# Patient Record
Sex: MALE | Race: WHITE | NOT HISPANIC OR LATINO | URBAN - METROPOLITAN AREA
[De-identification: names, ages, dates, MRNs, and addresses within clinical notes are randomized per-mention and may not be internally consistent; named-entity substitution may affect disease eponyms.]

---

## 2024-02-12 ENCOUNTER — APPOINTMENT (OUTPATIENT)
Dept: OTOLARYNGOLOGY | Facility: CLINIC | Age: 29
End: 2024-02-12
Payer: COMMERCIAL

## 2024-02-14 ENCOUNTER — OFFICE VISIT (OUTPATIENT)
Dept: OTOLARYNGOLOGY | Facility: CLINIC | Age: 29
End: 2024-02-14
Payer: COMMERCIAL

## 2024-02-14 ENCOUNTER — CLINICAL SUPPORT (OUTPATIENT)
Dept: AUDIOLOGY | Facility: CLINIC | Age: 29
End: 2024-02-14
Payer: COMMERCIAL

## 2024-02-14 VITALS
WEIGHT: 191 LBS | SYSTOLIC BLOOD PRESSURE: 138 MMHG | HEIGHT: 68 IN | BODY MASS INDEX: 28.95 KG/M2 | DIASTOLIC BLOOD PRESSURE: 76 MMHG

## 2024-02-14 DIAGNOSIS — H61.23 BILATERAL IMPACTED CERUMEN: ICD-10-CM

## 2024-02-14 DIAGNOSIS — H93.8X2 FULLNESS IN EAR, LEFT: ICD-10-CM

## 2024-02-14 DIAGNOSIS — H90.12 CONDUCTIVE HEARING LOSS OF LEFT EAR WITH UNRESTRICTED HEARING OF RIGHT EAR: Primary | ICD-10-CM

## 2024-02-14 DIAGNOSIS — H62.42 OTITIS EXTERNA, FUNGAL, LEFT EAR: Primary | ICD-10-CM

## 2024-02-14 DIAGNOSIS — B36.9 OTITIS EXTERNA, FUNGAL, LEFT EAR: Primary | ICD-10-CM

## 2024-02-14 DIAGNOSIS — H90.12 CONDUCTIVE HEARING LOSS OF LEFT EAR WITH UNRESTRICTED HEARING OF RIGHT EAR: ICD-10-CM

## 2024-02-14 PROCEDURE — 99203 OFFICE O/P NEW LOW 30 MIN: CPT

## 2024-02-14 PROCEDURE — 69210 REMOVE IMPACTED EAR WAX UNI: CPT

## 2024-02-14 PROCEDURE — 92557 COMPREHENSIVE HEARING TEST: CPT | Performed by: AUDIOLOGIST

## 2024-02-14 PROCEDURE — 92550 TYMPANOMETRY & REFLEX THRESH: CPT | Performed by: AUDIOLOGIST

## 2024-02-14 RX ORDER — CLOTRIMAZOLE 1 G/ML
SOLUTION TOPICAL
Qty: 30 ML | Refills: 0 | Status: SHIPPED | OUTPATIENT
Start: 2024-02-14 | End: 2024-03-06 | Stop reason: WASHOUT

## 2024-02-14 ASSESSMENT — PATIENT HEALTH QUESTIONNAIRE - PHQ9
2. FEELING DOWN, DEPRESSED OR HOPELESS: NOT AT ALL
SUM OF ALL RESPONSES TO PHQ9 QUESTIONS 1 AND 2: 0
1. LITTLE INTEREST OR PLEASURE IN DOING THINGS: NOT AT ALL

## 2024-02-14 ASSESSMENT — PAIN SCALES - GENERAL: PAINLEVEL_OUTOF10: 0 - NO PAIN

## 2024-02-14 ASSESSMENT — PAIN - FUNCTIONAL ASSESSMENT: PAIN_FUNCTIONAL_ASSESSMENT: 0-10

## 2024-02-14 ASSESSMENT — ENCOUNTER SYMPTOMS: OCCASIONAL FEELINGS OF UNSTEADINESS: 0

## 2024-02-14 NOTE — PROGRESS NOTES
AUDIOLOGY ADULT AUDIOMETRIC EVALUATION      Name:  Tank Bernardo  :  1995  Age:  28 y.o.  Date of Evaluation: 24    History:  Reason for visit:  Mr. Tank Bernardo was seen today as part of the visit with FADI Kang for an evaluation of hearing.   Chief Complaint   Patient presents with    Hearing Loss    Ear Fullness      Stated this past September he experienced a left sided ear infection, and completed two rounds of antibiotics. Mentioned the medications appeared to have relieved his pain, however, he continues to note problems in the left ear. This past December, he noticed a continued sensation of left sided ear fullness, or feeling like a sensation of something (fluid) may be in the left ear.   Although the hearing has improved, he noted the hearing on the left ear is decreased and he is not hearing as well as on the right.   Denied any concerns for the right ear or previous concerns for hearing loss.   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   Denied any current otalgia, tinnitus, dizziness/vertigo, ear surgery, recent falls, ear drainage, or sudden hearing loss.    EVALUATION     See Audiogram    RESULTS:    Otoscopic Evaluation:   Right Ear: Otoscopy revealed minimal cerumen with a healthy canal and a healthy tympanic membrane was visualized.    Left Ear: Otoscopy revealed deep soft partially occluding white cerumen/debris/infection and the tympanic membrane was unable able to be clearly visualized.     Immittance:  Immittance Measures: 226 Hz   Right Ear: Tympanometric testing revealed a normal type A tympanogram with normal middle ear pressure and normal static compliance.  Left Ear: Tympanometric testing revealed a type B flat tympanogram with no measurable middle ear pressure or static compliance and normal ear canal volume. Results may be consistent with excessive cerumen/debris or middle ear effusion.     Right Ear: Ipsilateral  acoustic reflexes were present at, 500-4,000 Hz, at expected sensation levels.  Left Ear: Ipsilateral acoustic reflexes were absent at, 500-4,000 Hz. Results are consistent with the test results.     Test technique:  Pure Tone Audiometry via TDH headphones    Reliability:   excellent    Pure Tone Audiometry:    Right Ear: Audiometric testing indicated normal peripheral hearing sensitivity from 125-8,000 Hz.   Left Ear:   Audiometric testing indicated normal peripheral hearing sensitivity through 1,000 Hz, sloping to a mild to slight essentially conductive hearing loss above. Note conductive components below 1,000 Hz.       Speech Audiometry:   Right Ear:  Speech Reception Threshold (SRT) was obtained at 10 dBHL                  Word Recognition scores were excellent (100%) in quiet when words were presented at 50 dBHL  Left Ear:  Speech Reception Threshold (SRT) was obtained at 20 dBHL                 Word Recognition scores were excellent (100%) in quiet when words were presented at 50 dBHL  Testing was performed with recorded NU-6 speech words in quiet. Speech thresholds were in good agreement with the pure tone averages in each ear.     IMPRESSIONS:  Today's test results are hearing loss requiring medical/otologic and audiologic follow-up.  The patient was counseled with regard to the findings.    RECOMMENDATIONS:  * Continue medical follow up with FADI Kang.  * Retest as medically indicated, or sooner if a change in hearing sensitivity is noticed.   * Wear hearing protection while in the presence of loud sounds.   * Use effective communication strategies such as asking the speaker to gain attention prior to speaking, speaking in the same room, repeating words that were heard, etc.    PATIENT EDUCATION:   Discussed results and recommendations with the patient and a copy of the hearing test was provided.  Questions were addressed and the patient was encouraged to contact our department should  concerns arise.  The patient was seen from  1:30-2:00 pm.

## 2024-02-15 NOTE — PATIENT INSTRUCTIONS
Dry Ear Precautions: Avoid swimming or hot tubs until ear infection is resolved. If you must swim, please use silicone ear plugs or rubber swim caps and make sure no water gets in the ears.  Use cotton ball covered with Vaseline before showering. Apply Vaseline on cotton ball and place it just at the opening of ear canal to create a water seal. Do not push the cotton ball all the way in the canal. After shower, remove the cotton ball and wipe off excess Vaseline using dry clean cloth. Then, use hair dryer on warm or cool air and hold it out 12 inches away from the affected ear and air dry ears for 30 seconds.

## 2024-03-06 ENCOUNTER — OFFICE VISIT (OUTPATIENT)
Dept: OTOLARYNGOLOGY | Facility: CLINIC | Age: 29
End: 2024-03-06
Payer: COMMERCIAL

## 2024-03-06 VITALS
HEIGHT: 68 IN | WEIGHT: 195 LBS | SYSTOLIC BLOOD PRESSURE: 135 MMHG | TEMPERATURE: 98.2 F | BODY MASS INDEX: 29.55 KG/M2 | DIASTOLIC BLOOD PRESSURE: 83 MMHG

## 2024-03-06 DIAGNOSIS — B36.9 OTITIS EXTERNA, FUNGAL, LEFT EAR: Primary | ICD-10-CM

## 2024-03-06 DIAGNOSIS — H61.22 IMPACTED CERUMEN OF LEFT EAR: ICD-10-CM

## 2024-03-06 DIAGNOSIS — H62.42 OTITIS EXTERNA, FUNGAL, LEFT EAR: Primary | ICD-10-CM

## 2024-03-06 PROCEDURE — 99213 OFFICE O/P EST LOW 20 MIN: CPT

## 2024-03-06 PROCEDURE — 69210 REMOVE IMPACTED EAR WAX UNI: CPT

## 2024-03-06 RX ORDER — CLOTRIMAZOLE AND BETAMETHASONE DIPROPIONATE 10; .64 MG/G; MG/G
1 CREAM TOPICAL ONCE
Status: SHIPPED | OUTPATIENT
Start: 2024-03-06

## 2024-03-06 ASSESSMENT — PATIENT HEALTH QUESTIONNAIRE - PHQ9
2. FEELING DOWN, DEPRESSED OR HOPELESS: NOT AT ALL
1. LITTLE INTEREST OR PLEASURE IN DOING THINGS: NOT AT ALL
SUM OF ALL RESPONSES TO PHQ9 QUESTIONS 1 AND 2: 0

## 2024-03-06 NOTE — PROGRESS NOTES
Patient ID: Tank Bernardo is a 28 y.o. male who presents for subsequent evaluation of left hearing loss, left aural fullness, left otorrhea and left ear itching.     PROVIDER IMPRESSIONS:  DIAGNOSES/PROBLEMS:  -Left fungal otitis externa  -Cerumen impaction left ear    ASSESSMENT:   Tank Bernardo is a pleasant 28 y.o. male who presents for subsequent evaluation of left hearing loss, left aural fullness, left otorrhea and mild left ear itching. Since last visit, symptoms are improved with use of clotrimazole otic drops for left fungal OE and boric acid powder applied to the left EAC at last ear cleaning.  Exam findings today revealed left EAC appears moist/erythematous with recurrent impaction of white rubbery fungal debris/cerumen Using appropriate instrumentation, impacted cerumen/fungal debris successfully removed and 2cc of lotrisone cream instilled into the left EAC. Based on the clinical information provided, symptoms and clinical exam findings are consistent with ongoing left fungal otitis externa.     PLAN:  Patient instructed to keep a cotton ball in the left ear and change cotton ball twice daily/as needed for management.   Follow-up: Patient may schedule for follow-up in 1-2 weeks for repeat aural toilet and to evaluate treatment outcomes. Patient is agreeable to this plan, all questions were answered to patient's satisfaction.     Subjective   HPI: Tank Bernardo is a 28 y.o. male who presents for 3 week follow-up for left hearing loss, left aural fullness, left otorrhea and mild left ear itching. Since last visit on 2/14/24, there has been moderate improvement in symptoms. Reports that his hearing has significantly improved in the left ear. In review, previous recommendations included clotrimazole otic drops for left fungal OE. Patient states they have been consistent with the recommended treatment. Denies the presence of new symptoms including hearing loss, ear pain, ear itching, tinnitus, ear drainage, ear  fullness/pressure, autophony, dizziness and/or vertigo.    RECALL 2/14/24:  HPI: Tank Bernardo is a 28 y.o. male who presents for the evaluation of left hearing loss and left aural fullness. The patient states that symptom onset began approximately 5 months ago and gradually progressed over time. Patient seen at urgent care in September 2023 for left ear pain, left ear fullness and left muffled hearing sensation and was prescribed p.o. cefdinir for an ear infection that provided mild symptom benefit of ear pain after completion. A few weeks later he received second course of p.o. cefdinir for ongoing symptoms which resolved left ear pain entirely after completion but did not improve left-sided hearing difficulty and ear fullness sensation. Describes left hearing loss as a muffled sound that he compares to hearing underwater and that changes with external ear movement. States that hearing in the left ear is currently around 50% of baseline hearing function. When asked about the presence of right hearing loss, ear pain, right ear fullness/pressure, tinnitus, ear itching, ear drainage, autophony, dizziness or vertigo, he admits to slight left ear itching and left ear drainage that appears white/yellow/crusted on his pillow when he lays on his left side. When asked about pertinent otologic history, the patient denies a history of recurrent ear infections, denies history of ear surgery, denies history of PE tube insertion. 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 The patient does not insert Q-tips in the ear canals but reports insertion of an o.t.c. ear endoscopic camera with a plastic applicator tool into the ear canals that he received in December 2023. He also states that he will occasionally instill hydrogen peroxide  into the ear canals for cleaning. The patient denies history of wearing hearing aid devices. The patient does not endorse a family history of hearing loss. Mentions a history of allergies to amoxicillin, stating that he has had a reaction in childhood of rash, nausea, and vomiting.    ASSESSMENT: Tank Bernardo is a pleasant 28 y.o. male who presents with symptoms of left hearing loss, left aural fullness, left otorrhea and mild left ear itching for the past 5 months. Based on the clinical information provided, symptoms and clinical exam findings are consistent with bilateral cerumen impaction and left fungal otitis externa (OE). Using appropriate instrumentation, impacted cerumen was removed from bilateral EACs. Impacted white/rubbery/moist fungal debris was also removed from left EAC, which appeared moist/inflamed/erythematous after cleaning, with application of 1 puff of boric acid powder to the left ear in clinic today after cleaning for antifungal treatment. Reassurance provided to patient that otologic exam today revealed no evidence of acute infection/inflammation in the right external auditory canal (EAC) and that tympanic membrane (TM) appears with no evidence of infection, effusion, retraction or perforation bilaterally.  Audiogram reviewed in detail with the patient, which revealed left conductive hearing loss and type B tympanogram on the left which is likely secondary to excessive cerumen and fungal debris in the left EAC during audiogram.   PLAN:  I recommended Clotrimazole 1% solution with 4-5 drops into the left ear canal twice daily for 21 days. Patient instructed on proper application of medication into the left ear and provided plastic pipette for application. I informed patient to initiate otic drops tomorrow as boric acid powder was applied in clinic today. Prescription e-submitted to pharmacy.   I counseled patient on safe interventions for cerumen management at home. Patient may wash the external  ear with a cloth. I reinforced education to the patient that they should avoid using cotton tipped applicators, tissues, paper, or any rigid object in the ear canal. I explained to the patient that doing so may cause wax to be pushed back into the ear canal and stuck and also risks injury to the ear canal and ear drum. Patient advised to avoid using o.t.c. ear endoscopic camera in the ear canals and to dispose/clean any ear plug or ear buds that he has used previously.  Patient counseled on dry ear precautions, detailed information on discussion provided in the patient instructions section.  Follow-up: Patient may schedule for follow-up in 2-3 weeks without audiogram for repeat aural toilet of left EAC and to evaluate treatment outcomes for left fungal OE. May consider instillation of lotrisone cream at follow-up into the left EAC for ongoing infection. If infection is resolved, will consider repeat audiogram. They may follow-up sooner, if needed. Patient is agreeable to this plan, all questions were answered to patient's satisfaction.     PATIENT HISTORY:  No past medical history on file.   No past surgical history on file.   Allergies   Allergen Reactions    Amoxicillin Hives and Nausea/vomiting        Current Outpatient Medications:     clotrimazole (Lotrimin) 1 % external solution, INSTILL 4-5 DROPS INTO THE LEFT EAR CANAL TWICE DAILY FOR 21 DAYS AND THEN STOP., Disp: 30 mL, Rfl: 0   Tobacco Use: Not on file      Alcohol Use: Not on file      Social History     Substance and Sexual Activity   Drug Use Not on file        Review of Systems   All other systems negative.     Objective   ENT FOCUSED PHYSICAL EXAM:   Right Ear: External inspection of ear with no deformity, scars, or masses. Mastoid is nontender. External auditory canal is partially impacted with cerumen. Unable to visualize TM.   Left Ear: External inspection of ear with no deformity, scars, or masses. Mastoid is nontender. External auditory canal is  completely impacted with cerumen and moist/white/rubbery debris. Unable to visualize TM.     EAR CLEANING PROCEDURE NOTE:  Indication: Cerumen impaction  Location: left ear canals  Procedure Note: The procedure was performed by the provider.  Visualization Instrument: A microscope with a #5 speculum was placed in the ear canals to visualize the ear canal debris.  Ear Cleaning Instrument and Outcome: Using the 5/7 suction and alligator forceps, a large amount of cerumen and infectious debris that appeared white/rubbery  was removed from the impacted EAC(s). After cleaning, using a 3cc syringe and a 20g catheter, 2cc of lotrisone cream was instilled into the left EAC and a cotton ball was applied.  Post-Procedure/Microscopic Otologic Exam:  Left Ear-- TM is intact with no sign of infection, effusion, or retraction.  No perforation seen. EAC is clear and appears moist/erythematous. Auto insufflation visible under microscopy.    Patient Status: The patient tolerated the procedure well.  Complications: There were no complications.     Andreina Sun, APRN-CNP

## 2024-03-18 NOTE — PROGRESS NOTES
Patient ID: Tank Bernardo is a 28 y.o. male who presents for subsequent evaluation.    PROVIDER IMPRESSIONS:  DIAGNOSES/PROBLEMS:  -Cerumen impaction, left ear  -History of bilateral fungal otitis externa    ASSESSMENT:   Tank Bernardo is a pleasant 28 y.o. male who presents for subsequent evaluation of left-sided hearing loss, left aural fullness, left otorrhea and mild left ear itching. Symptoms are resolved entirely after left EAC aural toilet and instillation of lotrisone topical cream into left EAC as a second treatment approach for left fungal OE at prior visit. Otologic exam findings today revealed resolution of fungal infection bilaterally with moderate amount of residual lotrisone cream/cerumen impacted in the left EAC which was successfully removed today. Reassurance provided that otologic exam appeared without evidence of acute infection/inflammation bilaterally and that TM appeared intact bilaterally without infection, effusion, retraction, or perforation bilaterally. Based on the clinical information provided, symptoms and clinical exam findings are consistent with left cerumen/lotrisone impaction and resolution of left fungal otitis externa.     PLAN:  I recommended patient continues to perform safe ear cleaning methods at home as discussed at prior visit. I advised patient to continue to refrain from inserting objects into the ear canals.   Follow-up: Patient may schedule for follow-up in 6 months with repeat audiogram to evaluate hearing function after resolution of fungal OE and monitor treatment outcomes. Patient is agreeable to this plan, all questions were answered to patient's satisfaction.     Subjective   HPI: Tank Bernardo is a 28 y.o. male who presents for a 2 week follow-up evaluation for left-sided hearing loss, left aural fullness, left otorrhea and mild left ear itching. Since last visit on 3/6/24, there has been significant improvement and resolution of all symptoms. In review,  "clotrimazole-betamethasone (lotrisone) cream was clinic-administered on 3/6/24 into the left EAC for ongoing evidence of left fungal OE, despite initial antifungal therapy on 2/14/24 with clinic-administered boric acid powder and after course completion of clotrimazole otic drops x 21 days into the left EAC. Patient states that approximately 4-5 days after last visit with lotrisone cream instillation, he noticed his left ear \"open up\" with significant relief and hearing return to baseline. Denies the presence of new symptoms including hearing loss, ear pain, ear itching, tinnitus, ear drainage, ear fullness/pressure, autophony, dizziness and/or vertigo. Mentions he continues to maintain dry ear precautions since prior visit and does not use Q-tips or foreign objects in the ear canals.     RECALL 3/6/24:  HPI: Tank Bernardo is a 28 y.o. male who presents for 3 week follow-up for left hearing loss, left aural fullness, left otorrhea and mild left ear itching. Since last visit on 2/14/24, there has been moderate improvement in symptoms. Reports that his hearing has significantly improved in the left ear. In review, previous recommendations included clotrimazole otic drops for left fungal OE. Patient states they have been consistent with the recommended treatment. Denies the presence of new symptoms including hearing loss, ear pain, ear itching, tinnitus, ear drainage, ear fullness/pressure, autophony, dizziness and/or vertigo.   ASSESSMENT:   Tank Bernardo is a pleasant 28 y.o. male who presents for subsequent evaluation of left hearing loss, left aural fullness, left otorrhea and mild left ear itching. Since last visit, symptoms are improved with use of clotrimazole otic drops for left fungal OE and boric acid powder applied to the left EAC at last ear cleaning.  Exam findings today revealed left EAC appears moist/erythematous with recurrent impaction of white rubbery fungal debris/cerumen Using appropriate " instrumentation, impacted cerumen/fungal debris successfully removed and 2cc of lotrisone cream instilled into the left EAC. Based on the clinical information provided, symptoms and clinical exam findings are consistent with ongoing left fungal otitis externa.   PLAN:  Patient instructed to keep a cotton ball in the left ear and change cotton ball twice daily/as needed for management.   Follow-up: Patient may schedule for follow-up in 1-2 weeks for repeat aural toilet and to evaluate treatment outcomes. Patient is agreeable to this plan, all questions were answered to patient's satisfaction.     RECALL 2/14/24:  HPI: Tank Bernardo is a 28 y.o. male who presents for the evaluation of left hearing loss and left aural fullness. The patient states that symptom onset began approximately 5 months ago and gradually progressed over time. Patient seen at urgent care in September 2023 for left ear pain, left ear fullness and left muffled hearing sensation and was prescribed p.o. cefdinir for an ear infection that provided mild symptom benefit of ear pain after completion. A few weeks later he received second course of p.o. cefdinir for ongoing symptoms which resolved left ear pain entirely after completion but did not improve left-sided hearing difficulty and ear fullness sensation. Describes left hearing loss as a muffled sound that he compares to hearing underwater and that changes with external ear movement. States that hearing in the left ear is currently around 50% of baseline hearing function. When asked about the presence of right hearing loss, ear pain, right ear fullness/pressure, tinnitus, ear itching, ear drainage, autophony, dizziness or vertigo, he admits to slight left ear itching and left ear drainage that appears white/yellow/crusted on his pillow when he lays on his left side. When asked about pertinent otologic history, the patient denies a history of recurrent ear infections, denies history of ear surgery,  denies history of PE tube insertion. 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 The patient does not insert Q-tips in the ear canals but reports insertion of an o.t.c. ear endoscopic camera with a plastic applicator tool into the ear canals that he received in December 2023. He also states that he will occasionally instill hydrogen peroxide into the ear canals for cleaning. The patient denies history of wearing hearing aid devices. The patient does not endorse a family history of hearing loss. Mentions a history of allergies to amoxicillin, stating that he has had a reaction in childhood of rash, nausea, and vomiting.    ASSESSMENT: Tank Bernardo is a pleasant 28 y.o. male who presents with symptoms of left hearing loss, left aural fullness, left otorrhea and mild left ear itching for the past 5 months. Based on the clinical information provided, symptoms and clinical exam findings are consistent with bilateral cerumen impaction and left fungal otitis externa (OE). Using appropriate instrumentation, impacted cerumen was removed from bilateral EACs. Impacted white/rubbery/moist fungal debris was also removed from left EAC, which appeared moist/inflamed/erythematous after cleaning, with application of 1 puff of boric acid powder to the left ear in clinic today after cleaning for antifungal treatment. Reassurance provided to patient that otologic exam today revealed no evidence of acute infection/inflammation in the right external auditory canal (EAC) and that tympanic membrane (TM) appears with no evidence of infection, effusion, retraction or perforation bilaterally.  Audiogram reviewed in detail with the patient, which revealed left conductive hearing loss and type B tympanogram on the left which is likely secondary to  excessive cerumen and fungal debris in the left EAC during audiogram.   PLAN:  I recommended Clotrimazole 1% solution with 4-5 drops into the left ear canal twice daily for 21 days. Patient instructed on proper application of medication into the left ear and provided plastic pipette for application. I informed patient to initiate otic drops tomorrow as boric acid powder was applied in clinic today. Prescription e-submitted to pharmacy.   I counseled patient on safe interventions for cerumen management at home. Patient may wash the external ear with a cloth. I reinforced education to the patient that they should avoid using cotton tipped applicators, tissues, paper, or any rigid object in the ear canal. I explained to the patient that doing so may cause wax to be pushed back into the ear canal and stuck and also risks injury to the ear canal and ear drum. Patient advised to avoid using o.t.c. ear endoscopic camera in the ear canals and to dispose/clean any ear plug or ear buds that he has used previously.  Patient counseled on dry ear precautions, detailed information on discussion provided in the patient instructions section.  Follow-up: Patient may schedule for follow-up in 2-3 weeks without audiogram for repeat aural toilet of left EAC and to evaluate treatment outcomes for left fungal OE. May consider instillation of lotrisone cream at follow-up into the left EAC for ongoing infection. If infection is resolved, will consider repeat audiogram. They may follow-up sooner, if needed. Patient is agreeable to this plan, all questions were answered to patient's satisfaction.     PATIENT HISTORY:  No past medical history on file.   No past surgical history on file.   Allergies   Allergen Reactions    Amoxicillin Hives and Nausea/vomiting      No current outpatient medications on file.    Current Facility-Administered Medications:     clotrimazole-betamethasone (Lotrisone) cream 1 Application, 1 Application, Topical,  Once, [facility] Sheppard   Tobacco Use: Not on file      Alcohol Use: Not on file      Social History     Substance and Sexual Activity   Drug Use Not on file        Review of Systems   All other systems negative.     Objective   ENT FOCUSED PHYSICAL EXAM:   Right Ear: External inspection of ear with no deformity, scars, or masses. Mastoid is nontender. External auditory canal is clear. TM intact with no sign of infection, effusion, retraction, or perforation.    Left Ear: External inspection of ear with no deformity, scars, or masses. Mastoid is nontender. External auditory canal is partially impacted with cerumen and residual lotrisone as it appears as white/translucent/thin cream. Unable to visualize TM.       EAR CLEANING PROCEDURE NOTE:  Indication: Cerumen impaction  Location: left ear canals  Procedure Note: The procedure was performed by the provider.  Visualization Instrument: A microscope with a #5 speculum was placed in the ear canals to visualize the ear canal debris.  Ear Cleaning Instrument and Outcome: Using the 7 suction, a moderate amount of  cerumen and white/thin cream residue  was removed from the impacted EAC(s).  Post-Procedure/Microscopic Otologic Exam:  Left Ear-- TM is intact with minimal central myringosclerosis but no sign of infection, effusion, or retraction.  No perforation seen. EAC is clear.   Patient Status: The patient tolerated the procedure well.  Complications: There were no complications.     FADI Sheppard

## 2024-03-19 ENCOUNTER — OFFICE VISIT (OUTPATIENT)
Dept: OTOLARYNGOLOGY | Facility: CLINIC | Age: 29
End: 2024-03-19
Payer: COMMERCIAL

## 2024-03-19 VITALS
DIASTOLIC BLOOD PRESSURE: 84 MMHG | BODY MASS INDEX: 29.65 KG/M2 | SYSTOLIC BLOOD PRESSURE: 126 MMHG | TEMPERATURE: 98.3 F | WEIGHT: 195 LBS

## 2024-03-19 DIAGNOSIS — Z86.69 HISTORY OF OTITIS EXTERNA: ICD-10-CM

## 2024-03-19 DIAGNOSIS — H61.22 IMPACTED CERUMEN OF LEFT EAR: Primary | ICD-10-CM

## 2024-03-19 PROCEDURE — 99213 OFFICE O/P EST LOW 20 MIN: CPT

## 2024-03-19 PROCEDURE — 69210 REMOVE IMPACTED EAR WAX UNI: CPT

## 2024-09-24 ENCOUNTER — APPOINTMENT (OUTPATIENT)
Dept: AUDIOLOGY | Facility: CLINIC | Age: 29
End: 2024-09-24
Payer: COMMERCIAL

## 2024-09-24 ENCOUNTER — APPOINTMENT (OUTPATIENT)
Dept: OTOLARYNGOLOGY | Facility: CLINIC | Age: 29
End: 2024-09-24
Payer: COMMERCIAL

## 2024-10-15 ENCOUNTER — APPOINTMENT (OUTPATIENT)
Dept: OTOLARYNGOLOGY | Facility: CLINIC | Age: 29
End: 2024-10-15
Payer: COMMERCIAL

## 2024-10-15 ENCOUNTER — CLINICAL SUPPORT (OUTPATIENT)
Dept: AUDIOLOGY | Facility: CLINIC | Age: 29
End: 2024-10-15
Payer: COMMERCIAL

## 2024-10-15 VITALS
DIASTOLIC BLOOD PRESSURE: 100 MMHG | HEIGHT: 68 IN | BODY MASS INDEX: 28.04 KG/M2 | SYSTOLIC BLOOD PRESSURE: 143 MMHG | WEIGHT: 185 LBS

## 2024-10-15 DIAGNOSIS — H61.23 BILATERAL IMPACTED CERUMEN: Primary | ICD-10-CM

## 2024-10-15 PROCEDURE — 92557 COMPREHENSIVE HEARING TEST: CPT | Performed by: AUDIOLOGIST

## 2024-10-15 PROCEDURE — 3008F BODY MASS INDEX DOCD: CPT

## 2024-10-15 PROCEDURE — 92550 TYMPANOMETRY & REFLEX THRESH: CPT | Performed by: AUDIOLOGIST

## 2024-10-15 PROCEDURE — 99213 OFFICE O/P EST LOW 20 MIN: CPT

## 2024-10-15 PROCEDURE — 69210 REMOVE IMPACTED EAR WAX UNI: CPT

## 2024-10-15 NOTE — PROGRESS NOTES
Patient ID: Tank Bernardo is a 29 y.o. male who presents for subsequent evaluation of hearing.     PROVIDER IMPRESSIONS:  DIAGNOSES/PROBLEMS:  -Bilateral cerumen impaction    ASSESSMENT:   Tank Bernardo is a pleasant 29 y.o. male with a history of fungal otitis externa who presents for subsequent evaluation of ears and hearing. Since last visit, patient has overall been doing well without symptom recurrence. Based on the clinical information provided, clinical exam findings are consistent with bilateral cerumen impaction. Using appropriate instrumentation, impacted cerumen was successfully removed from the EAC bilaterally. Reassurance provided to patient that bilateral EAC appears with no sign of acute infection or inflammation and that bilateral TM appears with no sign of infection, effusion, retraction or perforation. Audiogram was reviewed in detail with the patient today, which revealed normal hearing and middle ear function results bilaterally, with significant improvement in left hearing function when compared to prior audiogram from February 2024. Patient offered reassurance and counseling on the current clinical findings and management recommendations.     PLAN:  Patient advised to wear ear hearing protection while in the presence of loud sounds.   Follow-up: Patient may schedule for follow-up with me in 1 year for routine removal of impacted cerumen. He may follow-up sooner, as needed. Patient is agreeable to plan. All questions answered to patient's satisfaction.     Subjective   HPI: Tank Bernardo is a 29 y.o. male with a history of fungal otitis externa who presents for follow-up evaluation.  Since last visit on 3/19/24, the patient denies symptom recurrence of left-sided hearing loss, left aural fullness, left otorrhea and mild left ear itching. Denies any new symptoms of hearing loss, ear pain, ear itching, tinnitus, ear drainage, ear fullness/pressure, autophony, dizziness and/or vertigo.     RECALL  "3/19/24:   HPI: Tank Bernardo is a 28 y.o. male who presents for a 2 week follow-up evaluation for left-sided hearing loss, left aural fullness, left otorrhea and mild left ear itching. Since last visit on 3/6/24, there has been significant improvement and resolution of all symptoms. In review, clotrimazole-betamethasone (lotrisone) cream was clinic-administered on 3/6/24 into the left EAC for ongoing evidence of left fungal OE, despite initial antifungal therapy on 2/14/24 with clinic-administered boric acid powder and after course completion of clotrimazole otic drops x 21 days into the left EAC. Patient states that approximately 4-5 days after last visit with lotrisone cream instillation, he noticed his left ear \"open up\" with significant relief and hearing return to baseline. Denies the presence of new symptoms including hearing loss, ear pain, ear itching, tinnitus, ear drainage, ear fullness/pressure, autophony, dizziness and/or vertigo. Mentions he continues to maintain dry ear precautions since prior visit and does not use Q-tips or foreign objects in the ear canals.   ASSESSMENT:   Tank Bernardo is a pleasant 28 y.o. male who presents for subsequent evaluation of left-sided hearing loss, left aural fullness, left otorrhea and mild left ear itching. Symptoms are resolved entirely after left EAC aural toilet and instillation of lotrisone topical cream into left EAC as a second treatment approach for left fungal OE at prior visit. Otologic exam findings today revealed resolution of fungal infection bilaterally with moderate amount of residual lotrisone cream/cerumen impacted in the left EAC which was successfully removed today. Reassurance provided that otologic exam appeared without evidence of acute infection/inflammation bilaterally and that TM appeared intact bilaterally without infection, effusion, retraction, or perforation bilaterally. Based on the clinical information provided, symptoms and clinical exam " findings are consistent with left cerumen/lotrisone impaction and resolution of left fungal otitis externa.   PLAN:  I recommended patient continues to perform safe ear cleaning methods at home as discussed at prior visit. I advised patient to continue to refrain from inserting objects into the ear canals.   Follow-up: Patient may schedule for follow-up in 6 months with repeat audiogram to evaluate hearing function after resolution of fungal OE and monitor treatment outcomes. Patient is agreeable to this plan, all questions were answered to patient's satisfaction.     RECALL 3/6/24:  HPI: Tank Bernardo is a 28 y.o. male who presents for 3 week follow-up for left hearing loss, left aural fullness, left otorrhea and mild left ear itching. Since last visit on 2/14/24, there has been moderate improvement in symptoms. Reports that his hearing has significantly improved in the left ear. In review, previous recommendations included clotrimazole otic drops for left fungal OE. Patient states they have been consistent with the recommended treatment. Denies the presence of new symptoms including hearing loss, ear pain, ear itching, tinnitus, ear drainage, ear fullness/pressure, autophony, dizziness and/or vertigo.   ASSESSMENT:   Tank Bernardo is a pleasant 28 y.o. male who presents for subsequent evaluation of left hearing loss, left aural fullness, left otorrhea and mild left ear itching. Since last visit, symptoms are improved with use of clotrimazole otic drops for left fungal OE and boric acid powder applied to the left EAC at last ear cleaning.  Exam findings today revealed left EAC appears moist/erythematous with recurrent impaction of white rubbery fungal debris/cerumen Using appropriate instrumentation, impacted cerumen/fungal debris successfully removed and 2cc of lotrisone cream instilled into the left EAC. Based on the clinical information provided, symptoms and clinical exam findings are consistent with ongoing left  fungal otitis externa.   PLAN:  Patient instructed to keep a cotton ball in the left ear and change cotton ball twice daily/as needed for management.   Follow-up: Patient may schedule for follow-up in 1-2 weeks for repeat aural toilet and to evaluate treatment outcomes. Patient is agreeable to this plan, all questions were answered to patient's satisfaction.      RECALL 2/14/24:  HPI: Tank Bernardo is a 28 y.o. male who presents for the evaluation of left hearing loss and left aural fullness. The patient states that symptom onset began approximately 5 months ago and gradually progressed over time. Patient seen at urgent care in September 2023 for left ear pain, left ear fullness and left muffled hearing sensation and was prescribed p.o. cefdinir for an ear infection that provided mild symptom benefit of ear pain after completion. A few weeks later he received second course of p.o. cefdinir for ongoing symptoms which resolved left ear pain entirely after completion but did not improve left-sided hearing difficulty and ear fullness sensation. Describes left hearing loss as a muffled sound that he compares to hearing underwater and that changes with external ear movement. States that hearing in the left ear is currently around 50% of baseline hearing function. When asked about the presence of right hearing loss, ear pain, right ear fullness/pressure, tinnitus, ear itching, ear drainage, autophony, dizziness or vertigo, he admits to slight left ear itching and left ear drainage that appears white/yellow/crusted on his pillow when he lays on his left side. When asked about pertinent otologic history, the patient denies a history of recurrent ear infections, denies history of ear surgery, denies history of PE tube insertion. 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 The patient does not insert Q-tips in the ear canals but reports insertion of an o.t.c. ear endoscopic camera with a plastic applicator tool into the ear canals that he received in December 2023. He also states that he will occasionally instill hydrogen peroxide into the ear canals for cleaning. The patient denies history of wearing hearing aid devices. The patient does not endorse a family history of hearing loss. Mentions a history of allergies to amoxicillin, stating that he has had a reaction in childhood of rash, nausea, and vomiting.    ASSESSMENT: Tank Bernardo is a pleasant 28 y.o. male who presents with symptoms of left hearing loss, left aural fullness, left otorrhea and mild left ear itching for the past 5 months. Based on the clinical information provided, symptoms and clinical exam findings are consistent with bilateral cerumen impaction and left fungal otitis externa (OE). Using appropriate instrumentation, impacted cerumen was removed from bilateral EACs. Impacted white/rubbery/moist fungal debris was also removed from left EAC, which appeared moist/inflamed/erythematous after cleaning, with application of 1 puff of boric acid powder to the left ear in clinic today after cleaning for antifungal treatment. Reassurance provided to patient that otologic exam today revealed no evidence of acute infection/inflammation in the right external auditory canal (EAC) and that tympanic membrane (TM) appears with no evidence of infection, effusion, retraction or perforation bilaterally.  Audiogram reviewed in detail with the patient, which revealed left conductive hearing loss and type B tympanogram on the left which is likely secondary to excessive cerumen and fungal debris in the left EAC during audiogram.   PLAN:  I recommended Clotrimazole 1% solution with 4-5 drops into the left ear canal twice daily for 21 days. Patient instructed on proper application of medication into  the left ear and provided plastic pipette for application. I informed patient to initiate otic drops tomorrow as boric acid powder was applied in clinic today. Prescription e-submitted to pharmacy.   I counseled patient on safe interventions for cerumen management at home. Patient may wash the external ear with a cloth. I reinforced education to the patient that they should avoid using cotton tipped applicators, tissues, paper, or any rigid object in the ear canal. I explained to the patient that doing so may cause wax to be pushed back into the ear canal and stuck and also risks injury to the ear canal and ear drum. Patient advised to avoid using o.t.c. ear endoscopic camera in the ear canals and to dispose/clean any ear plug or ear buds that he has used previously.  Patient counseled on dry ear precautions, detailed information on discussion provided in the patient instructions section.  Follow-up: Patient may schedule for follow-up in 2-3 weeks without audiogram for repeat aural toilet of left EAC and to evaluate treatment outcomes for left fungal OE. May consider instillation of lotrisone cream at follow-up into the left EAC for ongoing infection. If infection is resolved, will consider repeat audiogram. They may follow-up sooner, if needed. Patient is agreeable to this plan, all questions were answered to patient's satisfaction.     PATIENT HISTORY:  No past medical history on file.   No past surgical history on file.   Allergies   Allergen Reactions    Amoxicillin Hives and Nausea/vomiting      No current outpatient medications on file.    Current Facility-Administered Medications:     clotrimazole-betamethasone (Lotrisone) cream 1 Application, 1 Application, Topical, Once, Andreina Sun, ABI-CNP   Tobacco Use: Unknown (11/10/2019)    Received from [facility]    Patient History     Smoking Tobacco Use: Never     Smokeless Tobacco Use: Unknown     Passive Exposure: Not on file      Alcohol Use: Not on  file      Social History     Substance and Sexual Activity   Drug Use Not on file        Review of Systems   All other systems negative.     Objective   ENT FOCUSED PHYSICAL EXAM:   Right Ear: External inspection of ear with no deformity, scars, or masses. Mastoid is nontender. External auditory canal is partially impacted with cerumen.   Left Ear: External inspection of ear with no deformity, scars, or masses. Mastoid is nontender. External auditory canal is partially impacted with cerumen.    EAR CLEANING PROCEDURE NOTE:  Indication: Cerumen impaction  Location: bilateral ear canals  Procedure Note: The procedure was performed by the provider.  Visualization Instrument: A microscope with a #5 speculum was placed in the ear canals to visualize the ear canal debris.  Ear Cleaning Instrument and Outcome: Using the 7 suction, a moderate amount of soft, yellow cerumen was removed from the impacted EAC(s).  Post-Procedure/Microscopic Otologic Exam:  Right Ear--TM is intact with no sign of infection, effusion, or retraction.  No perforation seen. EAC is clear. Auto insufflation visible under microscopy.  Left Ear-- TM is intact with no sign of infection, effusion, or retraction.  No perforation seen. EAC is clear. Auto insufflation visible under microscopy.  Patient Status: The patient tolerated the procedure well.  Complications: There were no complications.      RESULTS:  I personally reviewed the patient's audiogram from  10/15/24 , which revealed the following results: Normal hearing across all frequencies in bilateral ears. Normal tympanogram bilaterally. Excellent speech discrimination scores bilaterally. Acoustic reflexes present right ipsilateral, and present left ipsilateral.    When compared to prior audiogram from 2/14/24, results show significant improvement in left hearing function and left tympanogram and essentially stable findings in the right ear.     Andriena Sun, APRN-CNP

## 2024-10-16 ASSESSMENT — PAIN SCALES - GENERAL: PAINLEVEL_OUTOF10: 0 - NO PAIN

## 2024-10-16 ASSESSMENT — PAIN - FUNCTIONAL ASSESSMENT: PAIN_FUNCTIONAL_ASSESSMENT: 0-10

## 2024-10-16 ASSESSMENT — ENCOUNTER SYMPTOMS: OCCASIONAL FEELINGS OF UNSTEADINESS: 0

## 2024-10-16 NOTE — PROGRESS NOTES
AUDIOLOGY ADULT AUDIOMETRIC EVALUATION      Name:  Tank Bernardo  :  1995  Age:  29 y.o.  Date of Evaluation: 10/16/24    History:  Reason for visit:  Mr. Bernardo was seen today as part of the visit with NAHOMI Rose for an evaluation of hearing.   Chief Complaint   Patient presents with    Earache    Follow-up      The patient was seen for  a follow up appointment after a significant left sided ear infection last year. Mentioned he has noticed significant improvement in his symptoms and believes that he has returned to his current baseline.   Denied any concerns for hearing loss or difficulty communicating at this time. Previous hearing testing performed on 24 indicated normal hearing in the right ear and a mild conductive hearing loss in the left ear.   Denied any current otalgia, aural fullness, tinnitus, ear pressure, dizziness/vertigo, sinus/throat concerns, ear drainage, or sudden hearing loss.  Note previous case history from 24:  Stated this past September he experienced a left sided ear infection, and completed two rounds of antibiotics. Mentioned the medications appeared to have relieved his pain, however, he continues to note problems in the left ear. This past December, he noticed a continued sensation of left sided ear fullness, or feeling like a sensation of something (fluid) may be in the left ear.   Although the hearing has improved, he noted the hearing on the left ear is decreased and he is not hearing as well as on the right.   Denied any concerns for the right ear or previous concerns for hearing loss.   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   Denied any current otalgia, tinnitus, dizziness/vertigo, ear surgery, recent falls, ear drainage, or sudden hearing loss.    EVALUATION     See Audiogram    RESULTS:    Otoscopic Evaluation:   Right Ear: Otoscopy revealed excessive soft cerumen and healthy tympanic membrane was  visualized.   Left Ear:  Otoscopy revealed excessive soft cerumen and healthy tympanic membrane was visualized.     Immittance:  Immittance Measures: 226 Hz   Right Ear: Tympanometric testing revealed a normal type A tympanogram with normal middle ear pressure and normal static compliance.  Left Ear: Tympanometric testing revealed a normal type A tympanogram with normal middle ear pressure and normal static compliance.    Right Ear: Ipsilateral acoustic reflexes were present at, 500-4,000 Hz, at expected sensation levels.  Left Ear: Ipsilateral acoustic reflexes were present at, 500-4,000 Hz, at expected sensation levels.    Test technique:  Pure Tone Audiometry via TDH headphones    Reliability:   excellent    Pure Tone Audiometry:    Right Ear: Audiometric testing indicated normal peripheral hearing sensitivity from 125-8,000 Hz.  Left Ear:   Audiometric testing indicated normal peripheral hearing sensitivity from 125-8,000 Hz.      Speech Audiometry:   Right Ear:  Speech Reception Threshold (SRT) was obtained at 0 dBHL                 Word Recognition scores were excellent (100%) in quiet when words were presented at 40 dBHL  Left Ear:  Speech Reception Threshold (SRT) was obtained at 5 dBHL                 Word Recognition scores were excellent (100%) in quiet when words were presented at 40 dBHL  Testing was performed with recorded NU-6 speech words in quiet. Speech thresholds were in good agreement with the pure tone averages in each ear.     IMPRESSIONS:  Today's test results are consistent with normal peripheral hearing sensitivity, bilaterally.  The patient was counseled with regard to the findings.    RECOMMENDATIONS:  * Continue medical follow up with NAHOMI Rose  * Retest as medically indicated, or sooner if a change in hearing sensitivity is noticed.   * Wear hearing protection while in the presence of loud sounds.     PATIENT EDUCATION:   Discussed results and recommendations with the  patient.  Questions were addressed and the patient was encouraged to contact our department should concerns arise.  The patient was seen from  3:00-3:30 pm.

## 2025-10-13 ENCOUNTER — APPOINTMENT (OUTPATIENT)
Dept: OTOLARYNGOLOGY | Facility: CLINIC | Age: 30
End: 2025-10-13
Payer: COMMERCIAL